# Patient Record
Sex: MALE | Race: WHITE | HISPANIC OR LATINO | URBAN - METROPOLITAN AREA
[De-identification: names, ages, dates, MRNs, and addresses within clinical notes are randomized per-mention and may not be internally consistent; named-entity substitution may affect disease eponyms.]

---

## 2023-03-19 PROBLEM — H52.203 MYOPIA OF BOTH EYES WITH ASTIGMATISM: Status: ACTIVE | Noted: 2023-03-19

## 2023-03-19 PROBLEM — K21.9 GASTROESOPHAGEAL REFLUX: Status: ACTIVE | Noted: 2023-03-19

## 2023-03-19 PROBLEM — R40.0 DAYTIME SOMNOLENCE: Status: ACTIVE | Noted: 2023-03-19

## 2023-03-19 PROBLEM — R06.83 SNORING: Status: ACTIVE | Noted: 2023-03-19

## 2023-03-19 PROBLEM — H52.13 MYOPIA OF BOTH EYES WITH ASTIGMATISM: Status: ACTIVE | Noted: 2023-03-19

## 2023-03-19 PROBLEM — G47.33 OBSTRUCTIVE SLEEP APNEA SYNDROME: Status: ACTIVE | Noted: 2023-03-19

## 2023-03-19 PROBLEM — L30.9 ECZEMATOUS DERMATITIS: Status: ACTIVE | Noted: 2023-03-19

## 2023-03-19 PROBLEM — F90.9 ADHD (ATTENTION DEFICIT HYPERACTIVITY DISORDER): Status: ACTIVE | Noted: 2023-03-19

## 2023-03-19 PROBLEM — E66.01 CLASS 3 SEVERE OBESITY WITH SERIOUS COMORBIDITY AND BODY MASS INDEX (BMI) OF 40.0 TO 44.9 IN ADULT (MULTI): Status: ACTIVE | Noted: 2023-03-19

## 2023-03-19 RX ORDER — DEXTROAMPHETAMINE SACCHARATE, AMPHETAMINE ASPARTATE, DEXTROAMPHETAMINE SULFATE AND AMPHETAMINE SULFATE 2.5; 2.5; 2.5; 2.5 MG/1; MG/1; MG/1; MG/1
1 TABLET ORAL DAILY PRN
COMMUNITY

## 2023-03-19 RX ORDER — OMEPRAZOLE 40 MG/1
1 CAPSULE, DELAYED RELEASE ORAL DAILY
COMMUNITY

## 2023-03-19 RX ORDER — FLUOCINONIDE 0.5 MG/G
CREAM TOPICAL 2 TIMES DAILY
COMMUNITY
Start: 2017-01-18

## 2023-03-19 RX ORDER — BUPROPION HYDROCHLORIDE 300 MG/1
1 TABLET ORAL DAILY
COMMUNITY

## 2023-03-19 RX ORDER — DEXTROAMPHETAMINE SACCHARATE, AMPHETAMINE ASPARTATE MONOHYDRATE, DEXTROAMPHETAMINE SULFATE AND AMPHETAMINE SULFATE 7.5; 7.5; 7.5; 7.5 MG/1; MG/1; MG/1; MG/1
CAPSULE, EXTENDED RELEASE ORAL
COMMUNITY

## 2023-03-21 ENCOUNTER — OFFICE VISIT (OUTPATIENT)
Dept: PRIMARY CARE | Facility: CLINIC | Age: 28
End: 2023-03-21
Payer: COMMERCIAL

## 2023-03-21 VITALS
OXYGEN SATURATION: 96 % | TEMPERATURE: 97.5 F | DIASTOLIC BLOOD PRESSURE: 85 MMHG | BODY MASS INDEX: 40.6 KG/M2 | HEIGHT: 71 IN | HEART RATE: 102 BPM | WEIGHT: 290 LBS | SYSTOLIC BLOOD PRESSURE: 140 MMHG

## 2023-03-21 DIAGNOSIS — G47.33 OBSTRUCTIVE SLEEP APNEA SYNDROME: Primary | ICD-10-CM

## 2023-03-21 PROCEDURE — 3008F BODY MASS INDEX DOCD: CPT | Performed by: INTERNAL MEDICINE

## 2023-03-21 PROCEDURE — 99213 OFFICE O/P EST LOW 20 MIN: CPT | Performed by: INTERNAL MEDICINE

## 2023-03-21 NOTE — PROGRESS NOTES
"Subjective   Franki Gonzalez is a 27 y.o. male who presents for Follow-up.  HPI        See AWV regarding sleep issues and (+) HSAT.      CPAP titration study showed that, comfort/fit issues not withstanding, his severe obstructive sleep apnea was very well controlled even at lower pressures.    **COPIED FORWARD FOR REFERENCE**    ADHD has been going \"okay.\" Was started on Buproprion in addition to adderall about 6 months ago for depression. Feels like buproprion is helpful for interacting with people but hasn't noticed a significant change in his mood, though he is happy with the change for now. Denies SI     Previously evaluated by Dermatology for hand dermatitis, tried clobetasol as fluocinonide did not provide much relief. Uses fluocinonide 0.05% cream intermittently, but he is still having itchy, red, scaling skin around his MCPs despite using daily moisturizing lotion, Cetaphil.     See previous visit details regarding acid reflux, completely resolved with Protonix but recurred so now using daily. Mostly controlled, occasionally has symptoms if he eats very seasoned foods, usually towards the evenings. Switched to Nexium as was out of refills, feels like it has been working well    Has gained weight, not exercising, \"student lifestyle\", family stressors. Diet can use improvement. Does not get fast food but often eats prepared meals. No soda. Minimal snacking. Has tracked calories.  Hopes to return to the gym. Not biggest priority at this time.    Heterosexual, not involved in a relationship. See prior visit in 2019 regarding urination, seems to have resolved.     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 Lives in apartment alone.     Psych: Doreen Galdamez   Objective   /85   Pulse 102   Temp 36.4 °C (97.5 °F)   Ht 1.791 m (5' 10.5\")   Wt 132 kg (290 lb)   SpO2 96%   BMI 41.02 kg/m²    Physical Exam  Gen: NAD, pleasant, A&Ox3  HEENT: PERRL, EOMI, MMM, OP clear  Neck: supple, no thyromegaly, no JVD, normal " carotid upstroke  Pulm: lungs CTAB, good air movement  CV: RRR, no m/r/g, 2+ DP pulses  Abd: NABS, soft, NT, ND no HSM  Ext: no peripheral edema  Neuro: CN II-XII intact, no focal sensory or motor deficits, normal reflexes      Assessment/Plan     #Severe MARICRUZ: completed CPAP titration study, FEB 2023  - Recommend starting auto-CPAP with initial pressures of 6 to 12 cm of H2O with heated humidification via a Wisp (large) with EPR/Flex of 3.    He should discuss with DME about possibly trying out other mask for fit and/or using the above mask with a chinstrap.    We always recommend patients follow-up with sleep medicine after a few weeks of treatment but in this case it is especially important as there are also some issues with central sleep apnea which need to be assessed via machine download after using CPAP for a few weeks.    **COPIED FORWARD FOR REFERENCE**    #Dermatitis  - continue to use fluocinonide cream prn    #GE reflux  - pantoprazole 40mg daily --> switch to omeprazole today [symptoms recurred after d/c --DF]    #Obesity: significant wt gain  - encouraged continued regular exercise and healthy diet    #Urinary symptoms: not an issue today    # Health Maintenance:  - Colonoscopy: no indication for early screening  - Immunizations: annual influenza, provided today  - Lifestyle: encouraged diet and exercise as above  - HIV: no RFs identified, so due once before age 64  Problem List Items Addressed This Visit    None           Eladio Holden MD

## 2023-04-04 RX ORDER — ZOLPIDEM TARTRATE 5 MG/1
TABLET ORAL
COMMUNITY
Start: 2023-01-04

## 2023-05-10 ENCOUNTER — APPOINTMENT (OUTPATIENT)
Dept: LAB | Facility: LAB | Age: 28
End: 2023-05-10
Payer: COMMERCIAL

## 2023-05-10 LAB
ALANINE AMINOTRANSFERASE (SGPT) (U/L) IN SER/PLAS: 39 U/L (ref 10–52)
ALBUMIN (G/DL) IN SER/PLAS: 5 G/DL (ref 3.4–5)
ALKALINE PHOSPHATASE (U/L) IN SER/PLAS: 103 U/L (ref 33–120)
ANION GAP IN SER/PLAS: 15 MMOL/L (ref 10–20)
ASPARTATE AMINOTRANSFERASE (SGOT) (U/L) IN SER/PLAS: 25 U/L (ref 9–39)
BASOPHILS (10*3/UL) IN BLOOD BY AUTOMATED COUNT: 0.03 X10E9/L (ref 0–0.1)
BASOPHILS/100 LEUKOCYTES IN BLOOD BY AUTOMATED COUNT: 0.2 % (ref 0–2)
BILIRUBIN TOTAL (MG/DL) IN SER/PLAS: 0.4 MG/DL (ref 0–1.2)
CALCIUM (MG/DL) IN SER/PLAS: 10.3 MG/DL (ref 8.6–10.6)
CARBON DIOXIDE, TOTAL (MMOL/L) IN SER/PLAS: 28 MMOL/L (ref 21–32)
CHLORIDE (MMOL/L) IN SER/PLAS: 102 MMOL/L (ref 98–107)
CHOLESTEROL (MG/DL) IN SER/PLAS: 156 MG/DL (ref 0–199)
CHOLESTEROL IN HDL (MG/DL) IN SER/PLAS: 47.1 MG/DL
CHOLESTEROL/HDL RATIO: 3.3
CREATININE (MG/DL) IN SER/PLAS: 1.3 MG/DL (ref 0.5–1.3)
EOSINOPHILS (10*3/UL) IN BLOOD BY AUTOMATED COUNT: 0.06 X10E9/L (ref 0–0.7)
EOSINOPHILS/100 LEUKOCYTES IN BLOOD BY AUTOMATED COUNT: 0.5 % (ref 0–6)
ERYTHROCYTE DISTRIBUTION WIDTH (RATIO) BY AUTOMATED COUNT: 12.7 % (ref 11.5–14.5)
ERYTHROCYTE MEAN CORPUSCULAR HEMOGLOBIN CONCENTRATION (G/DL) BY AUTOMATED: 32.1 G/DL (ref 32–36)
ERYTHROCYTE MEAN CORPUSCULAR VOLUME (FL) BY AUTOMATED COUNT: 81 FL (ref 80–100)
ERYTHROCYTES (10*6/UL) IN BLOOD BY AUTOMATED COUNT: 5.78 X10E12/L (ref 4.5–5.9)
ESTIMATED AVERAGE GLUCOSE FOR HBA1C: 108 MG/DL
GFR MALE: 76 ML/MIN/1.73M2
GLUCOSE (MG/DL) IN SER/PLAS: 115 MG/DL (ref 74–99)
HEMATOCRIT (%) IN BLOOD BY AUTOMATED COUNT: 47 % (ref 41–52)
HEMOGLOBIN (G/DL) IN BLOOD: 15.1 G/DL (ref 13.5–17.5)
HEMOGLOBIN A1C/HEMOGLOBIN TOTAL IN BLOOD: 5.4 %
IMMATURE GRANULOCYTES/100 LEUKOCYTES IN BLOOD BY AUTOMATED COUNT: 0.6 % (ref 0–0.9)
LDL: 71 MG/DL (ref 0–99)
LEUKOCYTES (10*3/UL) IN BLOOD BY AUTOMATED COUNT: 13.3 X10E9/L (ref 4.4–11.3)
LYMPHOCYTES (10*3/UL) IN BLOOD BY AUTOMATED COUNT: 3.42 X10E9/L (ref 1.2–4.8)
LYMPHOCYTES/100 LEUKOCYTES IN BLOOD BY AUTOMATED COUNT: 25.7 % (ref 13–44)
MONOCYTES (10*3/UL) IN BLOOD BY AUTOMATED COUNT: 0.66 X10E9/L (ref 0.1–1)
MONOCYTES/100 LEUKOCYTES IN BLOOD BY AUTOMATED COUNT: 5 % (ref 2–10)
NEUTROPHILS (10*3/UL) IN BLOOD BY AUTOMATED COUNT: 9.04 X10E9/L (ref 1.2–7.7)
NEUTROPHILS/100 LEUKOCYTES IN BLOOD BY AUTOMATED COUNT: 68 % (ref 40–80)
NRBC (PER 100 WBCS) BY AUTOMATED COUNT: 0 /100 WBC (ref 0–0)
PLATELETS (10*3/UL) IN BLOOD AUTOMATED COUNT: 337 X10E9/L (ref 150–450)
POTASSIUM (MMOL/L) IN SER/PLAS: 4.8 MMOL/L (ref 3.5–5.3)
PROTEIN TOTAL: 7.8 G/DL (ref 6.4–8.2)
SODIUM (MMOL/L) IN SER/PLAS: 140 MMOL/L (ref 136–145)
TRIGLYCERIDE (MG/DL) IN SER/PLAS: 191 MG/DL (ref 0–149)
UREA NITROGEN (MG/DL) IN SER/PLAS: 15 MG/DL (ref 6–23)
VLDL: 38 MG/DL (ref 0–40)

## 2023-06-21 ENCOUNTER — OFFICE VISIT (OUTPATIENT)
Dept: PRIMARY CARE | Facility: CLINIC | Age: 28
End: 2023-06-21

## 2023-06-21 VITALS
HEIGHT: 71 IN | WEIGHT: 293 LBS | DIASTOLIC BLOOD PRESSURE: 79 MMHG | BODY MASS INDEX: 41.02 KG/M2 | SYSTOLIC BLOOD PRESSURE: 123 MMHG | TEMPERATURE: 97.7 F | HEART RATE: 88 BPM | OXYGEN SATURATION: 95 %

## 2023-06-21 DIAGNOSIS — K21.9 GASTROESOPHAGEAL REFLUX DISEASE WITHOUT ESOPHAGITIS: ICD-10-CM

## 2023-06-21 DIAGNOSIS — E66.01 CLASS 3 SEVERE OBESITY DUE TO EXCESS CALORIES WITH SERIOUS COMORBIDITY AND BODY MASS INDEX (BMI) OF 40.0 TO 44.9 IN ADULT (MULTI): Primary | ICD-10-CM

## 2023-06-21 DIAGNOSIS — G47.33 OBSTRUCTIVE SLEEP APNEA SYNDROME: ICD-10-CM

## 2023-06-21 PROBLEM — R06.83 SNORING: Status: RESOLVED | Noted: 2023-03-19 | Resolved: 2023-06-21

## 2023-06-21 PROBLEM — R40.0 DAYTIME SOMNOLENCE: Status: RESOLVED | Noted: 2023-03-19 | Resolved: 2023-06-21

## 2023-06-21 PROCEDURE — 99213 OFFICE O/P EST LOW 20 MIN: CPT | Performed by: INTERNAL MEDICINE

## 2023-06-21 PROCEDURE — 3008F BODY MASS INDEX DOCD: CPT | Performed by: INTERNAL MEDICINE

## 2023-06-21 NOTE — PROGRESS NOTES
"Subjective   Franki Gonzalez is a 27 y.o. male who presents for No chief complaint on file..  HPI        See AWV regarding sleep issues and (+) HSAT.      CPAP titration study showed that, comfort/fit issues not withstanding, his severe obstructive sleep apnea was very well controlled even at lower pressures.    Received his CPAP a few weeks ago, having some difficulty with that.  Feels like air is not going in, difficulty to inspire.  Oftentimes wakes up after an hour or 2 and takes off CPAP.  Currently using a mask that fits over the nose.  Has not followed up with DME to troubleshoot, recommended discussing with them his issues.  Also has appointment with sleep medicine scheduled for July.    Acid reflux controlled with daily Prilosec.  Has been unable to wean off PPI in the past.    Not exercising, needs to work on diet and overall lifestyle.    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     Psych: Doreen Galdamez   Objective   /79   Pulse 88   Temp 36.5 °C (97.7 °F)   Ht 1.791 m (5' 10.5\")   Wt 133 kg (293 lb)   SpO2 95%   BMI 41.45 kg/m²    Physical Exam  Gen: NAD, pleasant, A&Ox3  HEENT: PERRL, EOMI, MMM, OP clear  Neck: supple, no thyromegaly, no JVD, normal carotid upstroke  Pulm: lungs CTAB, good air movement  CV: RRR, no m/r/g, 2+ DP pulses  Abd: NABS, soft, NT, ND no HSM  Ext: no peripheral edema  Neuro: CN II-XII intact, no focal sensory or motor deficits, normal reflexes      Assessment/Plan     #Severe MARICRUZ: completed CPAP titration study, FEB 2023  -Started auto-CPAP with initial pressures of 6 to 12 cm of H2O with heated humidification via a Wisp (large) with EPR/Flex of 3.  -Recommend checking with DME regarding issues with comfort and fit  -Initial sleep medicine appointment is pending, emphasized that this is especially important as there are also some issues with central sleep apnea which need to be assessed via machine download after using CPAP for a few " weeks.    **COPIED FORWARD FOR REFERENCE**    #Dermatitis  - continue to use fluocinonide cream prn    #GE reflux  - Continue omeprazole indefinitely    #Obesity: significant wt gain  - encouraged continued regular exercise and healthy diet    # Health Maintenance:  - Colonoscopy: no indication for early screening  - Immunizations: annual influenza, provided today  - Lifestyle: encouraged diet and exercise as above  - HIV: no RFs identified, so due once before age 64  Problem List Items Addressed This Visit    None           Eladio Holden MD

## 2023-11-02 ENCOUNTER — APPOINTMENT (OUTPATIENT)
Dept: SLEEP MEDICINE | Facility: HOSPITAL | Age: 28
End: 2023-11-02

## 2024-01-11 ENCOUNTER — APPOINTMENT (OUTPATIENT)
Dept: PRIMARY CARE | Facility: CLINIC | Age: 29
End: 2024-01-11

## 2024-02-01 ENCOUNTER — APPOINTMENT (OUTPATIENT)
Dept: SLEEP MEDICINE | Facility: HOSPITAL | Age: 29
End: 2024-02-01

## 2024-02-28 ENCOUNTER — APPOINTMENT (OUTPATIENT)
Dept: PRIMARY CARE | Facility: CLINIC | Age: 29
End: 2024-02-28

## 2024-05-02 ENCOUNTER — APPOINTMENT (OUTPATIENT)
Dept: SLEEP MEDICINE | Facility: HOSPITAL | Age: 29
End: 2024-05-02

## 2024-07-18 ENCOUNTER — APPOINTMENT (OUTPATIENT)
Dept: PRIMARY CARE | Facility: CLINIC | Age: 29
End: 2024-07-18